# Patient Record
Sex: FEMALE | Race: WHITE
[De-identification: names, ages, dates, MRNs, and addresses within clinical notes are randomized per-mention and may not be internally consistent; named-entity substitution may affect disease eponyms.]

---

## 2017-02-10 ENCOUNTER — HOSPITAL ENCOUNTER (OUTPATIENT)
Dept: HOSPITAL 84 - D.MRI | Age: 42
Discharge: HOME | End: 2017-02-10
Attending: ORTHOPAEDIC SURGERY
Payer: COMMERCIAL

## 2017-02-10 VITALS — BODY MASS INDEX: 22.3 KG/M2

## 2017-02-10 DIAGNOSIS — M25.511: Primary | ICD-10-CM

## 2017-02-22 LAB
ERYTHROCYTE [DISTWIDTH] IN BLOOD BY AUTOMATED COUNT: 12.6 % (ref 11.5–14.5)
HCT VFR BLD CALC: 41.4 % (ref 36–48)
HGB BLD-MCNC: 13.8 G/DL (ref 12–16)
MCH RBC QN AUTO: 29.8 PG (ref 26–34)
MCHC RBC AUTO-ENTMCNC: 33.3 G/DL (ref 31–37)
MCV RBC: 89.4 FL (ref 80–100)
PLATELET # BLD: 181 10X3/UL (ref 130–400)
PMV BLD AUTO: 10.1 FL (ref 7.4–10.4)
RBC # BLD AUTO: 4.63 10X6/UL (ref 4–5.4)
WBC # BLD AUTO: 8.4 10X3/UL (ref 4.8–10.8)

## 2017-02-23 ENCOUNTER — HOSPITAL ENCOUNTER (OUTPATIENT)
Dept: HOSPITAL 84 - D.OPS | Age: 42
Discharge: HOME | End: 2017-02-23
Attending: ORTHOPAEDIC SURGERY
Payer: COMMERCIAL

## 2017-02-23 VITALS — WEIGHT: 167 LBS | BODY MASS INDEX: 23.38 KG/M2 | BODY MASS INDEX: 23.38 KG/M2 | HEIGHT: 71 IN

## 2017-02-23 VITALS — DIASTOLIC BLOOD PRESSURE: 66 MMHG | SYSTOLIC BLOOD PRESSURE: 113 MMHG

## 2017-02-23 DIAGNOSIS — M75.41: ICD-10-CM

## 2017-02-23 DIAGNOSIS — S43.431A: Primary | ICD-10-CM

## 2017-02-23 DIAGNOSIS — M13.811: ICD-10-CM

## 2017-02-27 NOTE — OP
PATIENT NAME:  MARISA ESCALANTE                      MEDICAL RECORD: [id number]
:73                                             LOCATION:[facility]          
                                                         ADMISSION DATE:        
SURGEON:  SAM NGUYEN MD        
 
 
DATE OF OPERATION:  2017
 
PREOPERATIVE DIAGNOSES:  SLAP lesion of the right shoulder with impingement
syndrome and acromioclavicular arthritis.
 
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
syndrome and acromioclavicular arthritis.
 
PROCEDURES:
1.  Arthroscopic SLAP repair of the right shoulder.
2.  Arthroscopic distal clavicle excision of the right shoulder.
3.  Arthroscopic subacromial decompression, acromioplasty, and bursectomy, right
shoulder.
 
SURGEON:  Sam Nguyen MD.
 
ANESTHESIA:  General.
 
INTRAOPERATIVE COMPLICATIONS:  None.
 
SUMMARY OF PATHOLOGIC FINDINGS:  Consistent with the preoperative MRI, the
patient had an anterior and posterior labral tear along with a type 3 acromion
with excoriation of the coracoacromial ligament and chondromalacia of the distal
clavicle.
 
OPERATIVE SUMMARY IN DETAIL:  After obtaining the appropriate preoperative
orthopedic surgery consent as well as anesthetic consultation, evaluation and
clearance, the patient was brought to the operating room and placed in a left
lateral decubitus position.  All pressure points were well padded to include
down leg peroneal pad as well as axillary roll.  The patient was held firmly to
the operating table using the vacuum pack suction system.  Right upper extremity
and shoulder were then prepped and draped in a routine sterile fashion.  The arm
was held in the Arthrex traction boom at 30 degrees of forward flexion, 30
degrees of abduction with 10 pounds of traction laterally.  Arthroscopy was
established in the glenohumeral joint from a posterior portal.  Anterior portal
was established in the anterior safe interval.  Diagnostic arthroscopy did
reveal the above-mentioned findings.  Attention was first turned to
decortication and debridement of the superior aspect of the glenoid from
approximately the 10 o'clock to the 2 o'clock position and 10:30-2:00 position. 
Having completed this, accessory tertiary portal was created and the labrum was
affixed anteriorly using the 2.6 PushLock from Arthrex and labral tape.  This
was affixed nicely anteriorly and then a second anchor was placed posterior to
the bicipital labral junction.  Again, 2.6 PushLock from Arthrex with labral
tape resulting in 2 very good adhesions points.  The bicipital labral root was
not violated.  The patient did have bicipital tendinitis, but not enough to
warrant biceps tenodesis.  Having completed this, attention was turned to the
subacromial space.
 
While on the subacromial space, the Docena tissue ablation system was utilized
to denude the undersurface of the acromion of all soft tissue elements.  A 5-0
barrel jr was used to perform acromioplasty at the level of acromioclavicular
joint.  Through a separate arthroscopic portal, the distal clavicle was taken
 
 
 
OPERATIVE REPORT                               [id number]    MARISA ESCALANTE    
 
 
down for 1 cm.  Having completed this, arthroscopy portals were closed in
routine interrupted fashion using 4-0 Prolene.  Sterile dressings were applied. 
The patient was awakened and taken to the recovery room in stable condition. 
All final needle and sponge counts were correct.
 
TRANSINT:[id number] Voice Confirmation ID: [id number] DOCUMENT ID: [id number]
                                           
                                           PATRICK FRANCIS, SAM WYMAN        
 
 
 
Electronically Signed by SAM NGUYEN MD on 17 at 1107
 
 
 
 
 
 
 
 
 
 
 
 
 
 
 
 
 
 
 
 
 
 
 
 
 
 
 
 
 
 
 
 
 
 
 
CC:                                                             [id number]
DICTATION DATE: 17 1055     :     17 1424      [facility] 
                                                                      17
[facility]                                          
[address] [place] [address]

## 2017-06-14 ENCOUNTER — HOSPITAL ENCOUNTER (OUTPATIENT)
Dept: HOSPITAL 84 - D.MAMMO | Age: 42
Discharge: HOME | End: 2017-06-14
Attending: OBSTETRICS & GYNECOLOGY
Payer: COMMERCIAL

## 2017-06-14 VITALS — BODY MASS INDEX: 23.3 KG/M2

## 2017-06-14 DIAGNOSIS — Z12.31: Primary | ICD-10-CM

## 2018-02-04 ENCOUNTER — HOSPITAL ENCOUNTER (EMERGENCY)
Dept: HOSPITAL 84 - D.ER | Age: 43
Discharge: HOME | End: 2018-02-04
Payer: COMMERCIAL

## 2018-02-04 VITALS — BODY MASS INDEX: 23.3 KG/M2

## 2018-02-04 DIAGNOSIS — R07.89: Primary | ICD-10-CM

## 2018-02-04 DIAGNOSIS — I45.10: ICD-10-CM

## 2018-02-04 LAB
ALBUMIN SERPL-MCNC: 4.6 G/DL (ref 3.4–5)
ALP SERPL-CCNC: 122 U/L (ref 46–116)
ALT SERPL-CCNC: 24 U/L (ref 10–68)
ANION GAP SERPL CALC-SCNC: 12.6 MMOL/L (ref 8–16)
BASOPHILS NFR BLD AUTO: 0.1 % (ref 0–2)
BILIRUB SERPL-MCNC: 0.22 MG/DL (ref 0.2–1.3)
BUN SERPL-MCNC: 12 MG/DL (ref 7–18)
CALCIUM SERPL-MCNC: 9.4 MG/DL (ref 8.5–10.1)
CHLORIDE SERPL-SCNC: 103 MMOL/L (ref 98–107)
CHOLEST/HDLC SERPL: 3 RATIO (ref 2.3–4.1)
CK MB SERPL-MCNC: 1.3 U/L (ref 0–3.6)
CK SERPL-CCNC: 114 UL (ref 21–215)
CO2 SERPL-SCNC: 27.8 MMOL/L (ref 21–32)
CREAT SERPL-MCNC: 1.1 MG/DL (ref 0.6–1.3)
EOSINOPHIL NFR BLD: 1.3 % (ref 0–7)
ERYTHROCYTE [DISTWIDTH] IN BLOOD BY AUTOMATED COUNT: 13.2 % (ref 11.5–14.5)
GLOBULIN SER-MCNC: 3.5 G/L
GLUCOSE SERPL-MCNC: 88 MG/DL (ref 74–106)
HCT VFR BLD CALC: 43.9 % (ref 36–48)
HDLC SERPL-MCNC: 93 MG/DL (ref 32–96)
HGB BLD-MCNC: 14.9 G/DL (ref 12–16)
IMM GRANULOCYTES NFR BLD: 0.1 % (ref 0–5)
LDL-HDL RATIO: 1.7 RATIO (ref 1.5–3.5)
LDLC SERPL-MCNC: 155 MG/DL (ref 0–100)
LYMPHOCYTES NFR BLD AUTO: 45.2 % (ref 15–50)
MCH RBC QN AUTO: 30.3 PG (ref 26–34)
MCHC RBC AUTO-ENTMCNC: 33.9 G/DL (ref 31–37)
MCV RBC: 89.4 FL (ref 80–100)
MONOCYTES NFR BLD: 8 % (ref 2–11)
NEUTROPHILS NFR BLD AUTO: 45.3 % (ref 40–80)
OSMOLALITY SERPL CALC.SUM OF ELEC: 277 MOSM/KG (ref 275–300)
PLATELET # BLD: 217 10X3/UL (ref 130–400)
PMV BLD AUTO: 9.6 FL (ref 7.4–10.4)
POTASSIUM SERPL-SCNC: 3.4 MMOL/L (ref 3.5–5.1)
PROT SERPL-MCNC: 8.1 G/DL (ref 6.4–8.2)
RBC # BLD AUTO: 4.91 10X6/UL (ref 4–5.4)
SODIUM SERPL-SCNC: 140 MMOL/L (ref 136–145)
TRIGL SERPL-MCNC: 130 MG/DL (ref 30–200)
TROPONIN I SERPL-MCNC: < 0.017 NG/ML (ref 0–0.06)
WBC # BLD AUTO: 6.8 10X3/UL (ref 4.8–10.8)

## 2018-02-19 ENCOUNTER — HOSPITAL ENCOUNTER (OUTPATIENT)
Dept: HOSPITAL 84 - D.CATH | Age: 43
Discharge: HOME | End: 2018-02-19
Attending: INTERNAL MEDICINE
Payer: COMMERCIAL

## 2018-02-19 VITALS — SYSTOLIC BLOOD PRESSURE: 108 MMHG | DIASTOLIC BLOOD PRESSURE: 74 MMHG

## 2018-02-19 VITALS — HEIGHT: 71 IN | WEIGHT: 181.38 LBS | BODY MASS INDEX: 25.39 KG/M2 | BODY MASS INDEX: 25.39 KG/M2

## 2018-02-19 DIAGNOSIS — R94.39: ICD-10-CM

## 2018-02-19 DIAGNOSIS — Z01.812: ICD-10-CM

## 2018-02-19 DIAGNOSIS — I20.9: Primary | ICD-10-CM

## 2018-02-19 LAB
ANION GAP SERPL CALC-SCNC: 9.8 MMOL/L (ref 8–16)
BASOPHILS NFR BLD AUTO: 0.2 % (ref 0–2)
BUN SERPL-MCNC: 14 MG/DL (ref 7–18)
CALCIUM SERPL-MCNC: 8.6 MG/DL (ref 8.5–10.1)
CHLORIDE SERPL-SCNC: 106 MMOL/L (ref 98–107)
CO2 SERPL-SCNC: 27.9 MMOL/L (ref 21–32)
CREAT SERPL-MCNC: 1 MG/DL (ref 0.6–1.3)
EOSINOPHIL NFR BLD: 2 % (ref 0–7)
ERYTHROCYTE [DISTWIDTH] IN BLOOD BY AUTOMATED COUNT: 13.2 % (ref 11.5–14.5)
GLUCOSE SERPL-MCNC: 86 MG/DL (ref 74–106)
HCT VFR BLD CALC: 40.6 % (ref 36–48)
HGB BLD-MCNC: 13.6 G/DL (ref 12–16)
IMM GRANULOCYTES NFR BLD: 0.2 % (ref 0–5)
LYMPHOCYTES NFR BLD AUTO: 46.6 % (ref 15–50)
MCH RBC QN AUTO: 30 PG (ref 26–34)
MCHC RBC AUTO-ENTMCNC: 33.5 G/DL (ref 31–37)
MCV RBC: 89.4 FL (ref 80–100)
MONOCYTES NFR BLD: 6.1 % (ref 2–11)
NEUTROPHILS NFR BLD AUTO: 44.9 % (ref 40–80)
OSMOLALITY SERPL CALC.SUM OF ELEC: 278 MOSM/KG (ref 275–300)
PLATELET # BLD: 188 10X3/UL (ref 130–400)
PMV BLD AUTO: 9.5 FL (ref 7.4–10.4)
POTASSIUM SERPL-SCNC: 3.7 MMOL/L (ref 3.5–5.1)
RBC # BLD AUTO: 4.54 10X6/UL (ref 4–5.4)
SODIUM SERPL-SCNC: 140 MMOL/L (ref 136–145)
WBC # BLD AUTO: 5.9 10X3/UL (ref 4.8–10.8)

## 2018-08-06 ENCOUNTER — HOSPITAL ENCOUNTER (OUTPATIENT)
Dept: HOSPITAL 84 - D.MAMMO | Age: 43
Discharge: HOME | End: 2018-08-06
Attending: OBSTETRICS & GYNECOLOGY
Payer: COMMERCIAL

## 2018-08-06 VITALS — BODY MASS INDEX: 25.3 KG/M2

## 2018-08-06 DIAGNOSIS — Z12.31: Primary | ICD-10-CM

## 2020-03-13 ENCOUNTER — HOSPITAL ENCOUNTER (OUTPATIENT)
Dept: HOSPITAL 84 - D.MAMMO | Age: 45
Discharge: HOME | End: 2020-03-13
Attending: OBSTETRICS & GYNECOLOGY
Payer: COMMERCIAL

## 2020-03-13 VITALS — BODY MASS INDEX: 25.3 KG/M2

## 2020-03-13 DIAGNOSIS — Z12.31: Primary | ICD-10-CM

## 2021-03-29 ENCOUNTER — HOSPITAL ENCOUNTER (OUTPATIENT)
Dept: HOSPITAL 84 - D.MAMMO | Age: 46
Discharge: HOME | End: 2021-03-29
Attending: FAMILY MEDICINE
Payer: COMMERCIAL

## 2021-03-29 VITALS — BODY MASS INDEX: 25.3 KG/M2

## 2021-03-29 DIAGNOSIS — Z12.31: Primary | ICD-10-CM
